# Patient Record
Sex: FEMALE | Race: BLACK OR AFRICAN AMERICAN | NOT HISPANIC OR LATINO | Employment: FULL TIME | URBAN - METROPOLITAN AREA
[De-identification: names, ages, dates, MRNs, and addresses within clinical notes are randomized per-mention and may not be internally consistent; named-entity substitution may affect disease eponyms.]

---

## 2022-08-07 ENCOUNTER — HOSPITAL ENCOUNTER (EMERGENCY)
Facility: HOSPITAL | Age: 32
Discharge: HOME | End: 2022-08-07
Attending: EMERGENCY MEDICINE

## 2022-08-07 VITALS
DIASTOLIC BLOOD PRESSURE: 90 MMHG | WEIGHT: 293 LBS | RESPIRATION RATE: 18 BRPM | HEART RATE: 105 BPM | OXYGEN SATURATION: 100 % | HEIGHT: 63 IN | SYSTOLIC BLOOD PRESSURE: 170 MMHG | BODY MASS INDEX: 51.91 KG/M2 | TEMPERATURE: 100 F

## 2022-08-07 DIAGNOSIS — R07.81 RIB PAIN: ICD-10-CM

## 2022-08-07 LAB
B-HCG UR QL: NEGATIVE
CTP QC/QA: YES

## 2022-08-07 PROCEDURE — 99284 EMERGENCY DEPT VISIT MOD MDM: CPT | Mod: 25

## 2022-08-07 PROCEDURE — 81025 URINE PREGNANCY TEST: CPT | Performed by: PHYSICIAN ASSISTANT

## 2022-08-07 PROCEDURE — 96372 THER/PROPH/DIAG INJ SC/IM: CPT | Performed by: PHYSICIAN ASSISTANT

## 2022-08-07 PROCEDURE — 63600175 PHARM REV CODE 636 W HCPCS: Performed by: PHYSICIAN ASSISTANT

## 2022-08-07 RX ORDER — ACETAMINOPHEN 500 MG
500 TABLET ORAL EVERY 4 HOURS PRN
Qty: 20 TABLET | Refills: 0 | Status: SHIPPED | OUTPATIENT
Start: 2022-08-07 | End: 2022-08-12

## 2022-08-07 RX ORDER — MORPHINE SULFATE 4 MG/ML
6 INJECTION, SOLUTION INTRAMUSCULAR; INTRAVENOUS
Status: COMPLETED | OUTPATIENT
Start: 2022-08-07 | End: 2022-08-07

## 2022-08-07 RX ORDER — CYCLOBENZAPRINE HCL 10 MG
10 TABLET ORAL 3 TIMES DAILY PRN
Qty: 20 TABLET | Refills: 0 | Status: SHIPPED | OUTPATIENT
Start: 2022-08-07 | End: 2022-08-14

## 2022-08-07 RX ORDER — OXYCODONE HYDROCHLORIDE 5 MG/1
5 TABLET ORAL EVERY 4 HOURS PRN
Qty: 10 TABLET | Refills: 0 | Status: SHIPPED | OUTPATIENT
Start: 2022-08-07 | End: 2022-08-10

## 2022-08-07 RX ORDER — LIDOCAINE 50 MG/G
1 PATCH TOPICAL DAILY
Qty: 15 PATCH | Refills: 0 | Status: SHIPPED | OUTPATIENT
Start: 2022-08-07 | End: 2022-08-22

## 2022-08-07 RX ADMIN — MORPHINE SULFATE 6 MG: 4 INJECTION INTRAVENOUS at 03:08

## 2022-08-07 NOTE — FIRST PROVIDER EVALUATION
"Medical screening exam completed.  I have conducted a focused provider triage encounter, findings are as follows:    Brief history of present illness:  33 y/o F presenting for R rib pain after slipping out of tub and hitting R side. Reports associated SOB. Denies CP.   Denies HA, neck pain, back pain. No head injury.     Vitals:    08/07/22 1430   BP: (!) 161/91   BP Location: Right arm   Patient Position: Sitting   Pulse: (!) 112   Resp: 19   Temp: 99.6 °F (37.6 °C)   TempSrc: Oral   SpO2: 100%   Weight: 134.7 kg (297 lb)   Height: 5' 3" (1.6 m)       Pertinent physical exam:  Tachy. Limited exam TTP over R lateral ribs    Brief workup plan:  CXR    Preliminary workup initiated; this workup will be continued and followed by the physician or advanced practice provider that is assigned to the patient when roomed.  "

## 2022-08-07 NOTE — ED PROVIDER NOTES
Encounter Date: 8/7/2022       History     Chief Complaint   Patient presents with    Fall     Pt states she slipped while getting out of the shower, hitting her right ribcage on the edge of the toilet. Pt denies hitting head or loc. Pt denies cp, sob, n/v/d or blood thinners. Pt reports pain on inhalation.      Chief complaint:  Fall    HPI:  32-year-old female presenting status post mechanical slip and fall that occurred prior to arrival when the patient slipped while getting out of the shower and hit her right rib cage on the bathtub.  Denies head injury, LOC, headache, neck pain, back pain, weakness, paresthesias.  No use of blood thinners.  Denies any chest pain.  She reports she is feeling mildly SOB due to pain worse with inspiration         Review of patient's allergies indicates:   Allergen Reactions    Ketorolac Swelling    Penicillins Hives     History reviewed. No pertinent past medical history.  History reviewed. No pertinent surgical history.  History reviewed. No pertinent family history.  Social History     Tobacco Use    Smoking status: Never Smoker    Smokeless tobacco: Never Used   Substance Use Topics    Alcohol use: Never    Drug use: Never     Review of Systems   Constitutional: Negative for chills and fever.   HENT: Negative for congestion, ear pain, nosebleeds, rhinorrhea, sore throat and trouble swallowing.    Eyes: Negative for redness.   Respiratory: Negative for cough, shortness of breath and stridor.    Cardiovascular: Positive for chest pain.   Gastrointestinal: Negative for abdominal pain, constipation, diarrhea, nausea and vomiting.   Genitourinary: Negative for decreased urine volume, dysuria, frequency, hematuria and urgency.   Musculoskeletal: Negative for back pain and neck pain.   Skin: Negative for rash and wound.   Neurological: Negative for dizziness, speech difficulty, weakness, light-headedness, numbness and headaches.   Hematological: Does not bruise/bleed easily.    Psychiatric/Behavioral: Negative for confusion.       Physical Exam     Initial Vitals [08/07/22 1430]   BP Pulse Resp Temp SpO2   (!) 161/91 (!) 112 19 99.6 °F (37.6 °C) 100 %      MAP       --         Physical Exam    Nursing note and vitals reviewed.  Constitutional: She appears well-developed and well-nourished. No distress.   HENT:   Head: Normocephalic.   Right Ear: External ear normal.   Left Ear: External ear normal.   Eyes: Conjunctivae are normal. Right eye exhibits no discharge. Left eye exhibits no discharge. No scleral icterus.   Neck: No tracheal deviation present.   Cardiovascular: Normal rate and regular rhythm. Exam reveals no gallop and no friction rub.    No murmur heard.  Pulmonary/Chest: Breath sounds normal. No stridor. No respiratory distress. She has no wheezes. She has no rhonchi. She has no rales. She exhibits no tenderness.   Tenderness palpation over right lateral ribs.  No crepitus   Abdominal: Abdomen is soft. She exhibits no distension. There is no abdominal tenderness. There is no rebound and no guarding.   Musculoskeletal:         General: Normal range of motion.      Comments: No midline tenderness of the spine     Neurological: She is alert.   Skin: Skin is warm and dry. No rash noted. No erythema.   Psychiatric: She has a normal mood and affect. Her behavior is normal. Judgment and thought content normal.         ED Course   Procedures  Labs Reviewed   POCT URINE PREGNANCY          Imaging Results          X-Ray Chest PA And Lateral (Final result)  Result time 08/07/22 15:44:50    Final result by Anusha Anders MD (08/07/22 15:44:50)                 Impression:      Normal chest radiograph.      Electronically signed by: Anusha Anders MD  Date:    08/07/2022  Time:    15:44             Narrative:    EXAMINATION:  XR CHEST PA AND LATERAL    CLINICAL HISTORY:  Pleurodynia    TECHNIQUE:  PA and lateral views of the chest were  performed.    COMPARISON:  None    FINDINGS:  The mediastinal structures are midline.  The cardiac silhouette is not enlarged.  There is no evidence of acute pulmonary disease, pleural disease, lymph node enlargement, or cardiac decompensation.  No osseous abnormalities are identified.                                 Medications   morphine injection 6 mg (6 mg Intramuscular Given 8/7/22 1546)     Medical Decision Making:   ED Management:  32-year-old female presenting for evaluation of traumatic right rib pain after slip and fall that occurred prior to arrival.  No head injury, LOC, neck pain, back pain weakness paresthesias.  Exam above.  UPT negative.  Chest x-ray negative for pneumonia, pneumothorax acute abnormalities.  Patient's pain significantly improved after morphine IM.  Abdomen soft nontender.  Considered but doubt renal injury.  Heart lung exam unremarkable.  Will have patient follow-up with primary care p.o. old discharge with medications for symptomatic treatment.  Will have patient return the emergency department worsening symptoms or as needed.  Likely rib contusion.                      Clinical Impression:   Final diagnoses:  [R07.81] Rib pain          ED Disposition Condition    Discharge Stable        ED Prescriptions     Medication Sig Dispense Start Date End Date Auth. Provider    acetaminophen (TYLENOL) 500 MG tablet Take 1 tablet (500 mg total) by mouth every 4 (four) hours as needed. 20 tablet 8/7/2022 8/12/2022 Sunshine Barnes PA-C    cyclobenzaprine (FLEXERIL) 10 MG tablet Take 1 tablet (10 mg total) by mouth 3 (three) times daily as needed. 20 tablet 8/7/2022 8/14/2022 Sunshine Barnes PA-C    oxyCODONE (ROXICODONE) 5 MG immediate release tablet Take 1 tablet (5 mg total) by mouth every 4 (four) hours as needed for Pain. 10 tablet 8/7/2022 8/10/2022 Sunshine Barnes PA-C    LIDOcaine (LIDODERM) 5 % Place 1 patch onto the skin once daily. Remove & Discard patch within 12  hours or as directed by MD. May use 4% over the counter if not covered by insurance for 15 days 15 patch 8/7/2022 8/22/2022 Sunshine Barnes PA-C        Follow-up Information     Follow up With Specialties Details Why Contact Info    Your Primary Care Doctor  Schedule an appointment as soon as possible for a visit in 2 days      [facility] Emergency Dept Emergency Medicine Go to  As needed, If symptoms worsen [id number]  [facility] [id number] [phone]           Sunshine Barnes PA-C  08/07/22 1825

## 2022-08-07 NOTE — DISCHARGE INSTRUCTIONS

## 2022-08-13 ENCOUNTER — HOSPITAL ENCOUNTER (EMERGENCY)
Facility: OTHER | Age: 32
Discharge: HOME | End: 2022-08-13
Attending: EMERGENCY MEDICINE

## 2022-08-13 VITALS
TEMPERATURE: 100 F | HEART RATE: 116 BPM | WEIGHT: 293 LBS | OXYGEN SATURATION: 100 % | BODY MASS INDEX: 51.91 KG/M2 | RESPIRATION RATE: 18 BRPM | SYSTOLIC BLOOD PRESSURE: 159 MMHG | DIASTOLIC BLOOD PRESSURE: 96 MMHG | HEIGHT: 63 IN

## 2022-08-13 DIAGNOSIS — R00.0 CHRONIC TACHYCARDIA: ICD-10-CM

## 2022-08-13 DIAGNOSIS — S20.211A CONTUSION OF RIB ON RIGHT SIDE, INITIAL ENCOUNTER: Primary | ICD-10-CM

## 2022-08-13 DIAGNOSIS — R07.9 CHEST PAIN: ICD-10-CM

## 2022-08-13 DIAGNOSIS — R07.81 RIB PAIN: ICD-10-CM

## 2022-08-13 LAB
B-HCG UR QL: NEGATIVE
CTP QC/QA: YES

## 2022-08-13 PROCEDURE — 99284 EMERGENCY DEPT VISIT MOD MDM: CPT | Mod: 25

## 2022-08-13 PROCEDURE — 25000003 PHARM REV CODE 250: Performed by: PHYSICIAN ASSISTANT

## 2022-08-13 PROCEDURE — 81025 URINE PREGNANCY TEST: CPT | Performed by: PHYSICIAN ASSISTANT

## 2022-08-13 PROCEDURE — 96372 THER/PROPH/DIAG INJ SC/IM: CPT | Performed by: PHYSICIAN ASSISTANT

## 2022-08-13 PROCEDURE — 63600175 PHARM REV CODE 636 W HCPCS: Performed by: PHYSICIAN ASSISTANT

## 2022-08-13 RX ORDER — ORPHENADRINE CITRATE 30 MG/ML
30 INJECTION INTRAMUSCULAR; INTRAVENOUS
Status: COMPLETED | OUTPATIENT
Start: 2022-08-13 | End: 2022-08-13

## 2022-08-13 RX ORDER — TRAMADOL HYDROCHLORIDE 50 MG/1
50 TABLET ORAL EVERY 6 HOURS PRN
Qty: 10 TABLET | Refills: 0 | Status: SHIPPED | OUTPATIENT
Start: 2022-08-13 | End: 2022-09-01 | Stop reason: CLARIF

## 2022-08-13 RX ORDER — HYDROCODONE BITARTRATE AND ACETAMINOPHEN 5; 325 MG/1; MG/1
1 TABLET ORAL
Status: COMPLETED | OUTPATIENT
Start: 2022-08-13 | End: 2022-08-13

## 2022-08-13 RX ADMIN — HYDROCODONE BITARTRATE AND ACETAMINOPHEN 1 TABLET: 5; 325 TABLET ORAL at 12:08

## 2022-08-13 RX ADMIN — ORPHENADRINE CITRATE 30 MG: 30 INJECTION INTRAMUSCULAR; INTRAVENOUS at 12:08

## 2022-08-13 NOTE — ED PROVIDER NOTES
Encounter Date: 8/13/2022       History     Chief Complaint   Patient presents with    Rib Injury     C/o right rib pain 6/10 s/p slip and fall x 1 wk. Stated was getting out of tub when slipped and hit right rib on toilet. Denies any improvement with OTC medication. Denies any bruise. -LOC -blood thinners       Patient is a 32-year-old female with no significant medical history presents to the emergency department with right-sided chest wall pain.  Patient reports 60s ago she fell at her backside.  She states she landed on her right ribcage.  She states she did not hit her head or lose consciousness.  She states she was seen at another hospital and told it was just bruised.  She states she has been taking anti-inflammatories with no relief of symptoms.  She states her pain ranges from 6/10 to 10/10.  She states the pain is worse with breathing.    The history is provided by the patient.     Review of patient's allergies indicates:   Allergen Reactions    Ketorolac Swelling    Penicillins Hives     No past medical history on file.  No past surgical history on file.  No family history on file.  Social History     Tobacco Use    Smoking status: Never Smoker    Smokeless tobacco: Never Used   Substance Use Topics    Alcohol use: Never    Drug use: Never     Review of Systems   Constitutional: Negative for activity change, appetite change, chills, fatigue and fever.   HENT: Negative for congestion, ear discharge, ear pain, postnasal drip, rhinorrhea, sore throat and trouble swallowing.    Respiratory: Negative for cough and shortness of breath.    Cardiovascular: Negative for chest pain and leg swelling.   Gastrointestinal: Negative for abdominal pain, blood in stool, constipation, diarrhea, nausea and vomiting.   Genitourinary: Negative for dysuria, flank pain and hematuria.   Musculoskeletal: Negative for back pain, neck pain and neck stiffness.        Right-sided chest wall pain   Skin: Negative for rash and  wound.   Neurological: Negative for dizziness, weakness, light-headedness and headaches.       Physical Exam     Initial Vitals [08/13/22 1105]   BP Pulse Resp Temp SpO2   (!) 168/108 (!) 120 18 98.9 °F (37.2 °C) 100 %      MAP       --         Physical Exam    Nursing note and vitals reviewed.  Constitutional: She appears well-developed and well-nourished. She is not diaphoretic. She is Obese .  Non-toxic appearance. No distress.   HENT:   Head: Normocephalic.   Right Ear: External ear normal.   Left Ear: External ear normal.   Nose: Nose normal.   Mouth/Throat: Oropharynx is clear and moist. No oropharyngeal exudate.   Eyes: Conjunctivae are normal. Pupils are equal, round, and reactive to light.   Neck: Neck supple.   Normal range of motion.   Full passive range of motion without pain.     Cardiovascular: Regular rhythm.   Tachycardic   Pulmonary/Chest: Breath sounds normal. She exhibits bony tenderness (Right lateral inferior chest wall tenderness with no crepitus.  No ecchymoses.).   Abdominal: Abdomen is soft. Bowel sounds are normal. There is no abdominal tenderness.   Musculoskeletal:      Cervical back: Full passive range of motion without pain, normal range of motion and neck supple. No rigidity. No spinous process tenderness or muscular tenderness. Normal range of motion.     Neurological: She is alert and oriented to person, place, and time.   Skin: Skin is warm and dry. Capillary refill takes less than 2 seconds.   Psychiatric: She has a normal mood and affect.         ED Course   Procedures  Labs Reviewed   POCT URINE PREGNANCY          Imaging Results          X-Ray Ribs 2 View Right (Final result)  Result time 08/13/22 12:45:04    Final result by Preethi Peguero MD (08/13/22 12:45:04)                 Impression:      No evidence for displaced right sided rib fractures.      Electronically signed by: Preethi Peguero MD  Date:    08/13/2022  Time:    12:45             Narrative:    EXAMINATION:  XR  RIBS 2 VIEW RIGHT    CLINICAL HISTORY:  Pleurodynia    TECHNIQUE:  Three views of the right ribs    COMPARISON:  None    FINDINGS:  Examination of the right chest for ribs demonstrates no evidence for pneumothorax.  No evidence for pleural reaction.  No evidence for displaced rib fractures.  Nondisplaced rib fractures are not excluded and if indicated, follow-up examination in 7 to 10 days to demonstrate healing callus formation associated with nonvisualized / nondisplaced rib fractures.                               X-Ray Chest PA And Lateral (Final result)  Result time 08/13/22 12:39:12    Final result by Preethi Peguero MD (08/13/22 12:39:12)                 Impression:      No acute abnormality.      Electronically signed by: Preethi Peguero MD  Date:    08/13/2022  Time:    12:39             Narrative:    EXAMINATION:  XR CHEST PA AND LATERAL    CLINICAL HISTORY:  Chest pain, unspecified    TECHNIQUE:  PA and lateral views of the chest were performed.    COMPARISON:  08/07/2022    FINDINGS:  The lungs are clear, with normal appearance of pulmonary vasculature.No pleural effusion.No pneumothorax.    The cardiac silhouette is normal in size. The hilar and mediastinal contours are unremarkable.    Bones are intact.                                 Medications   HYDROcodone-acetaminophen 5-325 mg per tablet 1 tablet (1 tablet Oral Given 8/13/22 1203)   orphenadrine injection 30 mg (30 mg Intramuscular Given 8/13/22 1204)     Medical Decision Making:   Initial Assessment:   Urgent evaluation of a 32-year-old female who presents to the emergency department with chest wall tenderness.  Patient is tachycardic on initial exam.  She is very painful with manipulation of the chest wall and raising her right arm.  Lungs are clear to auscultation.  No other signs of trauma.  X-ray will be repeated today.  Patient will be given muscle relaxer and pain medication.  ED Management:  1:08 PM  Chest x-ray reveals no acute  cardiopulmonary process.  No evidence of displaced right-sided rib fractures.  Patient remains tachycardic - she reports this is her baseline.  She reports she takes metoprolol, but has not taken her dose today.  Of course I did consider pulmonary embolism, but with this being her baseline over the last 10 years.  I am not concerned for PE.  Patient is requesting something stronger for her pain.  Will give a short course of tramadol - as she cannot take NSAIDs due to her allergy.  Advised follow-up with PCP.  Advised to return to the emergency department with any worsening symptoms or concerns.                      Clinical Impression:   Final diagnoses:  [R07.81] Rib pain  [R07.9] Chest pain                 Alina Harris PA-C  08/13/22 [id number]

## 2022-08-13 NOTE — ED NOTES
Pt c.o right rib pain onset 1 week ago s/p falling while getting out of shower.  AAO x 3 bbs c.e nadn  No obvious injury noted to right rib area

## 2022-08-13 NOTE — ED NOTES
Paperwork given, pt reports understanding of noted medication- Ultram to RN. Pt discharged by RN.

## 2022-09-01 ENCOUNTER — HOSPITAL ENCOUNTER (EMERGENCY)
Facility: HOSPITAL | Age: 32
Discharge: HOME | End: 2022-09-01
Attending: STUDENT IN AN ORGANIZED HEALTH CARE EDUCATION/TRAINING PROGRAM

## 2022-09-01 VITALS
BODY MASS INDEX: 51.91 KG/M2 | RESPIRATION RATE: 18 BRPM | HEIGHT: 63 IN | SYSTOLIC BLOOD PRESSURE: 121 MMHG | OXYGEN SATURATION: 97 % | TEMPERATURE: 100 F | HEART RATE: 94 BPM | DIASTOLIC BLOOD PRESSURE: 80 MMHG | WEIGHT: 293 LBS

## 2022-09-01 DIAGNOSIS — T14.90XA TRAUMA: ICD-10-CM
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
## 2022-09-01 DIAGNOSIS — S20.221A CONTUSION OF RIGHT BACK WALL OF THORAX, INITIAL ENCOUNTER: ICD-10-CM

## 2022-09-01 LAB
B-HCG UR QL: NEGATIVE
CTP QC/QA: YES

## 2022-09-01 PROCEDURE — 25000003 PHARM REV CODE 250: Performed by: STUDENT IN AN ORGANIZED HEALTH CARE EDUCATION/TRAINING PROGRAM

## 2022-09-01 PROCEDURE — 99900035 HC TECH TIME PER 15 MIN (STAT)

## 2022-09-01 PROCEDURE — 81025 URINE PREGNANCY TEST: CPT | Performed by: EMERGENCY MEDICINE

## 2022-09-01 PROCEDURE — 99285 EMERGENCY DEPT VISIT HI MDM: CPT | Mod: 25

## 2022-09-01 RX ORDER — CYCLOBENZAPRINE HCL 10 MG
10 TABLET ORAL 3 TIMES DAILY PRN
Qty: 15 TABLET | Refills: 0 | Status: SHIPPED | OUTPATIENT
Start: 2022-09-01 | End: 2022-09-06

## 2022-09-01 RX ORDER — ACETAMINOPHEN 325 MG/1
325 TABLET ORAL EVERY 6 HOURS PRN
Qty: 14 TABLET | Refills: 0 | Status: SHIPPED | OUTPATIENT
Start: 2022-09-01

## 2022-09-01 RX ORDER — OXYCODONE AND ACETAMINOPHEN 5; 325 MG/1; MG/1
1 TABLET ORAL
Status: COMPLETED | OUTPATIENT
Start: 2022-09-01 | End: 2022-09-01

## 2022-09-01 RX ORDER — CYCLOBENZAPRINE HCL 10 MG
10 TABLET ORAL
Status: COMPLETED | OUTPATIENT
Start: 2022-09-01 | End: 2022-09-01

## 2022-09-01 RX ADMIN — OXYCODONE AND ACETAMINOPHEN 1 TABLET: 5; 325 TABLET ORAL at 07:09

## 2022-09-01 RX ADMIN — CYCLOBENZAPRINE 10 MG: 10 TABLET, FILM COATED ORAL at 07:09

## 2022-09-01 NOTE — Clinical Note
"Moises "Kris Pierre was seen and treated in our emergency department on 9/1/2022.  She may return to work on 09/03/2022.       If you have any questions or concerns, please don't hesitate to call.      Ghislaine Valladares MD"

## 2022-09-02 NOTE — ED PROVIDER NOTES
Encounter Date: 9/1/2022    SCRIBE #1 NOTE: I, Sherrill Connelly, am scribing for, and in the presence of,  Ghislaine Valladares MD.     History     Chief Complaint   Patient presents with    Fall     Pt c/o right rib pain post fall approximately two hours ago. Pt states she fell down four steps and hit her right side, causing severe pain on inhalation. Pt denies chest pain, sob, n/v/d. Pt denies hitting her head upon fall.     32 y.o. female, with no pertinent past medical history, who presents to the ED with rib pain s/p mechanical fall occurring 2h prior to arrival. Patient reports she was rushing to go down the stairs, accidentally slipped and fell, falling down 4 steps against the edge of the stairs, hitting the right side of her body. She sustained pain to her right lateral rib region with radiation to her back since the fall, with worsening pain on inspiration. Currently rates her pain 7/10 in severity. She has taken robaxin and tylenol for relief. No other exacerbating or alleviating factors. Denies cough, headache, abdominal pain, shoulder pain, or other associated symptoms. No anticoagulation use. No head trauma or LOC. Patient was in her normal state of health prior to this incident    The history is provided by the patient.   Review of patient's allergies indicates:   Allergen Reactions    Ketorolac Swelling    Penicillins Hives     Past Medical History:   Diagnosis Date    Hypertension      History reviewed. No pertinent surgical history.  Family History   Family history unknown: Yes     Social History     Tobacco Use    Smoking status: Never    Smokeless tobacco: Never   Substance Use Topics    Alcohol use: Never    Drug use: Never     Review of Systems   Constitutional:  Negative for chills and fever.   HENT:  Negative for congestion and rhinorrhea.    Eyes:  Negative for pain.   Respiratory:  Negative for cough and shortness of breath.    Cardiovascular:  Negative for chest pain and leg swelling.    Gastrointestinal:  Negative for abdominal pain, nausea and vomiting.   Endocrine: Negative for polyuria.   Genitourinary:  Negative for dysuria and hematuria.   Musculoskeletal:  Positive for arthralgias (right lateral rib) and back pain. Negative for gait problem and neck pain.   Skin:  Negative for rash.   Neurological:  Negative for syncope, weakness and headaches.     Physical Exam     Initial Vitals [09/01/22 1841]   BP Pulse Resp Temp SpO2   (!) 187/92 (!) 111 (!) 21 99.2 °F (37.3 °C) 100 %      MAP       --         Physical Exam    Constitutional: She appears well-nourished. She is not diaphoretic. She is Obese . No distress.   HENT:   Head: Normocephalic and atraumatic.   Eyes: Conjunctivae and EOM are normal. Pupils are equal, round, and reactive to light.   Neck: Neck supple.   Normal range of motion.  Cardiovascular:  Normal rate.           Pulmonary/Chest: Breath sounds normal. No respiratory distress. She has no wheezes.         She exhibits tenderness.   Abdominal: Abdomen is soft. Bowel sounds are normal. She exhibits no distension. There is no abdominal tenderness.   Musculoskeletal:      Cervical back: Normal range of motion and neck supple.         ED Course   Procedures  Labs Reviewed   POCT URINE PREGNANCY          Imaging Results              CT Chest Without Contrast (Final result)  Result time 09/01/22 22:08:01      Final result by Yokasta Koch MD (09/01/22 22:08:01)                   Impression:      No displaced rib fractures detected. Incompletely imaged mild infiltration of the subcutaneous fat of the right lateral lower chest and upper abdomen. The possibility of an extrathoracic thoracoabdominal wall contusion or injury may be considered.      Electronically signed by: Yokasta Koch  Date:    09/01/2022  Time:    22:08               Narrative:    EXAMINATION:  CT CHEST WITHOUT CONTRAST    CLINICAL HISTORY:  Fall with right-sided posterior rib pain;    TECHNIQUE:  Contiguous  axial 5 mm images was obtained from the lung apices through the lung bases.  No intravenous contrast was given.  Coronal and sagittal reformatted images were provided.    COMPARISON:  None.    FINDINGS:  No pneumothorax or pulmonary contusion are detected.  There are no displaced rib fractures.  There is mild infiltration in the subcutaneous fat of the right lateral lower chest wall, which is incompletely imaged (coronal image 116 and axial images 102 through 136)..  There is mild linear atelectasis or scarring in the right middle lobe and lingula.  There is mild bibasilar dependent atelectatic changes.    There is no evidence of mediastinal, hilar, or axillary adenopathy.    There is no pleural or pericardial effusion.    The heart size is within normal limits.    In the visualized upper abdomen, the gallbladder is surgically absent.    Mild dextroscoliosis is present.  There are few tiny marginal osteophytes.                                       X-Ray Ribs 2 View Right (Final result)  Result time 09/01/22 19:11:47      Final result by Ritesh Cerrato MD (09/01/22 19:11:47)                   Impression:      Negative right rib series, stable since August 13, 2012 exam.      Electronically signed by: Ritesh Cerrato  Date:    09/01/2022  Time:    19:11               Narrative:    EXAMINATION:  XR RIBS 2 VIEW RIGHT    CLINICAL HISTORY:  Injury, unspecified, initial encounter    TECHNIQUE:  Two views of the right ribs were performed.    COMPARISON:  08/13/2012    FINDINGS:  The right ribs appear intact without displaced fracture or bony destructive process.  The adjacent shoulder, spine and lungs appear unremarkable as imaged.                                       Medications   oxyCODONE-acetaminophen 5-325 mg per tablet 1 tablet (1 tablet Oral Given 9/1/22 1954)   cyclobenzaprine tablet 10 mg (10 mg Oral Given 9/1/22 1954)     Medical Decision Making:   History:   Old Medical Records: I decided to obtain old  medical records.  Initial Assessment:   32 y.o. female, with no pertinent past medical history, who presents to the ED with rib pain s/p mechanical fall occurring 2h prior to arrival.  Patient in no acute distress.  Vitals within normal limits.  Exam notable for reproducible chest tenderness to the right posterior chest.  Per chart review given this is a re-injury concern for possible rib fracture pulmonary contusion.  Chest x-ray without any acute abnormalities.  Given patient's significant pain will obtain CT chest without contrast for assessment of fracture and pulmonary contusion.  Will give pain medication and incentive spirometer.  No reported history of head injury or loss of consciousness.  No focal neurological deficits on exam to suggest need for CT imaging.  Furthermore patient's fall was mechanical so currently no indication for lab work for EKG in If CT imaging the negative and pain is controlled patient be discharged with close PCP follow-up  Clinical Tests:   Lab Tests: Ordered and Reviewed  Radiological Study: Ordered and Reviewed        Scribe Attestation:   Scribe #1: I performed the above scribed service and the documentation accurately describes the services I performed. I attest to the accuracy of the note.      ED Course as of 09/02/22 0103   u Sep 01, 2022   2151 Patient reports improvement of her pain.  Requesting to leave as her ride is here.  She reports she is not able to wait for her CT scan read.  Looked through the imaging did not see any acute abnormalities.  Spoke with patient that we will follow up with Her there is any acute findings but otherwise will have patient follow-up with her primary care doctor.  Prescription for Flexeril and Tylenol written.  Incentive spirometer given to patient to take home and instructions provided [AS]      ED Course User Index  [AS] Ghislaine Valladares MD             Clinical Impression:   Final diagnoses:  [T14.90XA] Trauma  [W19.XXXA] Fall, initial  encounter (Primary)  [S20.221A] Contusion of right back wall of thorax, initial encounter        ED Disposition Condition    Discharge Stable        I, Ghislaine Valladares MD, personally performed the services described in this documentation. All medical record entries made by the scribe were at my direction and in my presence. I have reviewed the chart and agree that the record reflects my personal performance and is accurate and complete. This dictation has been generated using M-Modal Fluency Direct dictation; some phonetic errors may occur.         ED Prescriptions       Medication Sig Dispense Start Date End Date Auth. Provider    cyclobenzaprine (FLEXERIL) 10 MG tablet Take 1 tablet (10 mg total) by mouth 3 (three) times daily as needed for Muscle spasms. 15 tablet 9/1/2022 9/6/2022 Ghislaine Valladares MD    acetaminophen (TYLENOL) 325 MG tablet Take 1 tablet (325 mg total) by mouth every 6 (six) hours as needed for Pain. 14 tablet 9/1/2022 -- Ghislaine Valladares MD          Follow-up Information    None          Ghislaine Valladares MD  09/02/22 0104

## 2022-09-02 NOTE — DISCHARGE INSTRUCTIONS
Thank you for coming to our Emergency Department today. It is important to remember that some problems are difficult to diagnose and may not be found during your first visit. Be sure to follow up with your primary care doctor and review any labs/imaging that was performed with them. If you do not have a primary care doctor, you may contact the one listed on your discharge paperwork or you may also call the [facility] Appointment Desk at [phone] to schedule an appointment with one.     All medications may potentially have side effects and it is impossible to predict which medications may give you side effects. If you feel that you are having a negative effect of any medication you should immediately stop taking them and seek medical attention.    Return to the ER with any questions/concerns, new/concerning symptoms, worsening or failure to improve. Do not drive or make any important decisions for 24 hours if you have received any pain medications, sedatives or mood altering drugs during your ER visit.

## 2023-09-19 ENCOUNTER — HOSPITAL ENCOUNTER (EMERGENCY)
Facility: HOSPITAL | Age: 33
Discharge: HOME | End: 2023-09-19
Attending: EMERGENCY MEDICINE

## 2023-09-19 VITALS
SYSTOLIC BLOOD PRESSURE: 122 MMHG | HEIGHT: 63 IN | WEIGHT: 293 LBS | OXYGEN SATURATION: 98 % | BODY MASS INDEX: 51.91 KG/M2 | TEMPERATURE: 99 F | RESPIRATION RATE: 18 BRPM | DIASTOLIC BLOOD PRESSURE: 87 MMHG | HEART RATE: 113 BPM
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
## 2023-09-19 DIAGNOSIS — S29.9XXA CHEST WALL INJURY: ICD-10-CM

## 2023-09-19 DIAGNOSIS — R06.02 SOB (SHORTNESS OF BREATH): ICD-10-CM

## 2023-09-19 DIAGNOSIS — S20.211A CONTUSION OF RIB ON RIGHT SIDE, INITIAL ENCOUNTER: Primary | ICD-10-CM

## 2023-09-19 LAB
B-HCG UR QL: NEGATIVE
CTP QC/QA: YES

## 2023-09-19 PROCEDURE — 99284 EMERGENCY DEPT VISIT MOD MDM: CPT | Mod: 25

## 2023-09-19 PROCEDURE — 63600175 PHARM REV CODE 636 W HCPCS: Performed by: EMERGENCY MEDICINE

## 2023-09-19 PROCEDURE — 93010 ELECTROCARDIOGRAM REPORT: CPT | Mod: ,,, | Performed by: INTERNAL MEDICINE

## 2023-09-19 PROCEDURE — 93010 EKG 12-LEAD: ICD-10-PCS | Mod: ,,, | Performed by: INTERNAL MEDICINE

## 2023-09-19 PROCEDURE — 96372 THER/PROPH/DIAG INJ SC/IM: CPT | Performed by: EMERGENCY MEDICINE

## 2023-09-19 PROCEDURE — 81025 URINE PREGNANCY TEST: CPT | Performed by: PHYSICIAN ASSISTANT

## 2023-09-19 PROCEDURE — 93005 ELECTROCARDIOGRAM TRACING: CPT

## 2023-09-19 RX ORDER — HYDROMORPHONE HYDROCHLORIDE 1 MG/ML
1 INJECTION, SOLUTION INTRAMUSCULAR; INTRAVENOUS; SUBCUTANEOUS
Status: COMPLETED | OUTPATIENT
Start: 2023-09-19 | End: 2023-09-19

## 2023-09-19 RX ORDER — OXYCODONE HYDROCHLORIDE 5 MG/1
5 CAPSULE ORAL EVERY 6 HOURS PRN
Qty: 16 CAPSULE | Refills: 0 | Status: SHIPPED | OUTPATIENT
Start: 2023-09-19

## 2023-09-19 RX ORDER — METOPROLOL SUCCINATE 100 MG/1
100 TABLET, EXTENDED RELEASE ORAL DAILY
COMMUNITY

## 2023-09-19 RX ORDER — NALOXONE HYDROCHLORIDE 1 MG/ML
INJECTION INTRAMUSCULAR; INTRAVENOUS; SUBCUTANEOUS
Qty: 2 ML | Refills: 11 | Status: SHIPPED | OUTPATIENT
Start: 2023-09-19

## 2023-09-19 RX ORDER — POLYETHYLENE GLYCOL 3350 17 G/17G
17 POWDER, FOR SOLUTION ORAL DAILY
Qty: 170 G | Refills: 0 | Status: SHIPPED | OUTPATIENT
Start: 2023-09-19 | End: 2023-09-29

## 2023-09-19 RX ORDER — LIDOCAINE 50 MG/G
1 PATCH TOPICAL DAILY
Qty: 20 PATCH | Refills: 0 | Status: SHIPPED | OUTPATIENT
Start: 2023-09-19

## 2023-09-19 RX ADMIN — HYDROMORPHONE HYDROCHLORIDE 1 MG: 1 INJECTION, SOLUTION INTRAMUSCULAR; INTRAVENOUS; SUBCUTANEOUS at 03:09

## 2023-09-19 NOTE — ED TRIAGE NOTES
Pt prersents to ED via POV co of R side pain after slipping and falling in the bath tub today. Denies hitting head or LOC. Pt reports increased pain during inspiration. No visible redness, swelling, or bruising. HX of HTN, takes her meds daily.

## 2023-09-19 NOTE — DISCHARGE INSTRUCTIONS
Please use medications as prescribed for symptoms.  Make sure you are taking deep breaths during the day.  Please return if you develop fever or shortness of breath.

## 2023-09-19 NOTE — ED PROVIDER NOTES
"Encounter Date: 9/19/2023    SCRIBE #1 NOTE: I, Sisi Blayne, am scribing for, and in the presence of,  Chaitanya Caldwell Jr., MD. I have scribed the following portions of the note - Other sections scribed: HPI, ROS.       History     Chief Complaint   Patient presents with    Rib pain     Pt reports having right rib/ chest pain today after falling in the tub this morning. Took tylenol at noon today with no relief. Pain 6/10     CC: Right Rib/ Chest pain    HPI: This is a 33 y.o. female, with a PMHx of HTN, who presents to the ED with right rib pain today. Patient reports she slipped in the shower and hit her right side on the edge of the bathtub. Patient is complaining of pain to the right rib/chest and right clavicle area. Patient notes pain with inhalation and bending motions. Patient reports taking tylenol with minimal relief as she reports general "soreness". No other exacerbating or alleviating factors. Patient denies sob, or other associated symptoms. This is the extent of the patient's complaints today in the Emergency Department. Patient is allergic to Penicillins, Ketorolac, and Toradol.        The history is provided by the patient. No  was used.     Review of patient's allergies indicates:   Allergen Reactions    Ketorolac Swelling    Penicillins Hives     Past Medical History:   Diagnosis Date    Hypertension      History reviewed. No pertinent surgical history.  Family History   Family history unknown: Yes     Social History     Tobacco Use    Smoking status: Never    Smokeless tobacco: Never   Substance Use Topics    Alcohol use: Never    Drug use: Never     Review of Systems   Constitutional:  Negative for fever.   HENT:  Negative for congestion, sore throat and trouble swallowing.    Eyes:  Negative for visual disturbance.   Respiratory:  Negative for cough and shortness of breath.    Cardiovascular:  Positive for chest pain (right rib pain, right clavicle area). "   Gastrointestinal:  Negative for abdominal pain, constipation, diarrhea, nausea and vomiting.   Endocrine: Negative for polyuria.   Genitourinary:  Negative for dysuria, flank pain, frequency and urgency.   Musculoskeletal:  Negative for back pain.   Skin:  Negative for rash.   Neurological:  Negative for headaches.       Physical Exam     Initial Vitals [09/19/23 1514]   BP Pulse Resp Temp SpO2   137/85 (!) 124 18 99.5 °F (37.5 °C) 100 %      MAP       --         Physical Exam    Nursing note and vitals reviewed.  Constitutional: She appears well-developed and well-nourished. She is not diaphoretic. No distress.   HENT:   Head: Normocephalic and atraumatic.   Right Ear: External ear normal.   Left Ear: External ear normal.   Nose: Nose normal.   Mouth/Throat: Oropharynx is clear and moist.   Eyes: Conjunctivae and EOM are normal. Pupils are equal, round, and reactive to light. Right eye exhibits no discharge. Left eye exhibits no discharge. No scleral icterus.   Neck: Neck supple.   Normal range of motion.  Cardiovascular:  Normal rate, regular rhythm, normal heart sounds and intact distal pulses.           No murmur heard.  Pulmonary/Chest: Breath sounds normal. No respiratory distress. She has no wheezes. She has no rhonchi. She has no rales.   Lungs are clear without wheezes, rales, rhonchi.  The patient is not in respiratory distress.  There is tenderness with palpation of the inferior lateral chest wall.  No evidence of contusion.     Abdominal: Abdomen is soft. Bowel sounds are normal. She exhibits no distension and no mass. There is no abdominal tenderness. There is no rebound and no guarding.   Musculoskeletal:         General: No tenderness or edema. Normal range of motion.      Cervical back: Normal range of motion and neck supple.     Neurological: She is alert and oriented to person, place, and time. She has normal strength. No cranial nerve deficit or sensory deficit.   Skin: Skin is warm and dry. No  rash noted. No erythema. No pallor.   Psychiatric: She has a normal mood and affect. Her behavior is normal. Judgment and thought content normal.         ED Course   Procedures  Labs Reviewed   POCT URINE PREGNANCY     EKG Readings: (Independently Interpreted)   Initial Reading: No STEMI. Ectopy: No Ectopy.   EKG reviewed and interpreted by me shows sinus tachycardia rate of 114 beats per minute.  The OH, QRS, QTC intervals are grossly within normal limits.  There is normal axis.  There is normal R-wave progression across the precordium.  There are no ST segment or T-wave ischemic findings.         Imaging Results              X-Ray Chest AP Portable (Final result)  Result time 09/19/23 16:31:36      Final result by Comfort Hart MD (09/19/23 16:31:36)                   Impression:      No acute intrathoracic process seen.      Electronically signed by: Comfort Hart MD  Date:    09/19/2023  Time:    16:31               Narrative:    EXAMINATION:  XR CHEST AP PORTABLE    CLINICAL HISTORY:  Unspecified injury of thorax, initial encounter    TECHNIQUE:  Single frontal view of the chest was performed.    COMPARISON:  08/13/2022    FINDINGS:  The cardiac silhouette is normal in size, midline.    The pulmonary vascularity is normal.    Low lung volume, poor inspiratory effort.  Minimal atelectasis at the lung basis.    No significant focal airspace disease, no pleural effusion, no pneumothorax.  The osseous structures appear normal.                                       X-Ray Ribs 2 View Right (Final result)  Result time 09/19/23 16:30:27      Final result by Comfort Hart MD (09/19/23 16:30:27)                   Impression:      No fracture identified.      Electronically signed by: Comfort Hart MD  Date:    09/19/2023  Time:    16:30               Narrative:    EXAMINATION:  XR RIBS 2 VIEW RIGHT    CLINICAL HISTORY:  Fall in (into) shower or empty bathtub, initial  encounter    TECHNIQUE:  Two views of the Right ribs were performed.    COMPARISON:  None.    FINDINGS:  No fracture, subluxation or osseous lesions identified. More importantly, no underlying pneumothorax or pulmonary contusion.    Surgical clips right upper abdominal quadrant.                                    X-Rays:   Independently Interpreted Readings:   Other Readings:  X-ray of the right ribs reveals no evidence of radiographically apparent rib fracture.  Chest x-ray reveals no evidence of infiltrate, consolidation, pleural effusion, pulmonary edema, or pneumothorax.    Medications   HYDROmorphone injection 1 mg (1 mg Intramuscular Given 9/19/23 6784)     Medical Decision Making  This is the emergent evaluation of a 33-year-old female who presents emergency department for evaluation of right-sided rib pain after falling this morning in the bathtub.  Differential diagnosis at the time of initial evaluation included, but was not limited to:  Rib contusion, rib fracture, pneumothorax.  Chest x-ray without evidence of pneumothorax, pleural effusion, or pulmonary edema.  No evidence of radiographically apparent rib fracture on right-sided rib x-rays.  Patient has a sinus tachycardia which I suspect is related to discomfort/pain.  She is not hypoxemic.  I will give her medications for symptoms to take at home.  I explained to her that some small nondisplaced fractures may not be found on x-ray but that treatment would be the same.  Advised her to take deep breaths during the day and to return for any fever or shortness of breath or any other new or worsening symptoms.      Amount and/or Complexity of Data Reviewed  Labs: ordered.     Details: Urine pregnancy test negative  Radiology: ordered and independent interpretation performed. Decision-making details documented in ED Course.    Risk  OTC drugs.  Prescription drug management.            Scribe Attestation:   Scribe #1: I performed the above scribed service  and the documentation accurately describes the services I performed. I attest to the accuracy of the note.              I, Chaitanya Caldwell MD, personally performed the services described in this documentation. All medical record entries made by the scribe were at my direction and in my presence. I have reviewed the chart and agree that the record reflects my personal performance and is accurate and complete.           Clinical Impression:   Final diagnoses:  [W18.2XXA] Fall in bathtub  [S29.9XXA] Chest wall injury  [R06.02] SOB (shortness of breath)  [S20.211A] Contusion of rib on right side, initial encounter (Primary)        ED Disposition Condition    Discharge Stable          ED Prescriptions       Medication Sig Dispense Start Date End Date Auth. Provider    oxyCODONE (OXY-IR) 5 mg Cap Take 1 capsule (5 mg total) by mouth every 6 (six) hours as needed for Pain. 16 capsule 9/19/2023 -- Chaitanya Caldwell Jr., MD    LIDOcaine (LIDODERM) 5 % Place 1 patch onto the skin once daily. Remove & Discard patch within 12 hours or as directed by MD 20 patch 9/19/2023 -- Chaitanya Caldwell Jr., MD    naloxone (NARCAN) 1 mg/mL injection 2 mg (1 mg per nostril) by Nasal route as needed for opioid overdose; may repeat in 3 to 5 minutes if not effective. Call 911 2 mL 9/19/2023 -- Chaitanya Caldwell Jr., MD    polyethylene glycol (GLYCOLAX) 17 gram/dose powder Take 17 g by mouth once daily. for 10 days 170 g 9/19/2023 9/29/2023 Chaitanya Caldwell Jr., MD          Follow-up Information    None          Chaitanya Caldwell Jr., MD  09/19/23 1700